# Patient Record
(demographics unavailable — no encounter records)

---

## 2025-05-28 NOTE — PHYSICAL EXAM
[NL] : no abnormal lymph nodes palpated [Dry] : dry [Erythematous] : erythematous [Patches] : patches [Hands] : hands

## 2025-05-28 NOTE — DISCUSSION/SUMMARY
[FreeTextEntry1] : Start topical antifungal as prescribed  Recommend daily moisturizer and topical steroid as needed. Side effect of topical steroids includes but not limited to lightening of skin. Avoid synthetic clothing. Bathe every 2-3 days, avoiding hot water.  Sleep with cool mist humidifier. Follow up with dermatologist

## 2025-05-28 NOTE — HISTORY OF PRESENT ILLNESS
[de-identified] : Possible fungal rash on both hands, per pt is getting worse and itchy  [FreeTextEntry6] : c/o rash over feet, on and off, itchy and scaly.  Hands- diagnosed with eczema most likely due to latex gloves used at work- saw derm for it, has follow up in a few months. Using topical steroids.